# Patient Record
Sex: FEMALE | URBAN - METROPOLITAN AREA
[De-identification: names, ages, dates, MRNs, and addresses within clinical notes are randomized per-mention and may not be internally consistent; named-entity substitution may affect disease eponyms.]

---

## 2017-11-20 ENCOUNTER — IMPORTED ENCOUNTER (OUTPATIENT)
Dept: URBAN - METROPOLITAN AREA CLINIC 31 | Facility: CLINIC | Age: 77
End: 2017-11-20

## 2017-11-20 PROBLEM — H35.3131: Noted: 2017-11-20

## 2017-11-20 PROBLEM — Z96.1: Noted: 2017-11-20

## 2017-11-20 PROCEDURE — 92014 COMPRE OPH EXAM EST PT 1/>: CPT

## 2017-11-20 NOTE — PATIENT DISCUSSION
1.  ARMD OU dry - Follow up with Dr. Alexis Echols as scheduled. 2. Pseudophakia OU - IOLs stable. Monitor. 3. Return for an appointment in 1 year for comprehensive exam. Optos with Dr. Cruz Abdul.

## 2022-04-02 ASSESSMENT — TONOMETRY
OS_IOP_MMHG: 17
OD_IOP_MMHG: 16